# Patient Record
Sex: FEMALE | Race: WHITE | Employment: UNEMPLOYED | URBAN - METROPOLITAN AREA
[De-identification: names, ages, dates, MRNs, and addresses within clinical notes are randomized per-mention and may not be internally consistent; named-entity substitution may affect disease eponyms.]

---

## 2019-06-13 ENCOUNTER — HOSPITAL ENCOUNTER (EMERGENCY)
Age: 19
Discharge: HOME | End: 2019-06-13
Payer: COMMERCIAL

## 2019-06-13 ENCOUNTER — APPOINTMENT (OUTPATIENT)
Dept: ULTRASOUND IMAGING | Age: 19
End: 2019-06-13
Payer: COMMERCIAL

## 2019-06-13 VITALS
HEART RATE: 76 BPM | SYSTOLIC BLOOD PRESSURE: 107 MMHG | HEIGHT: 67 IN | DIASTOLIC BLOOD PRESSURE: 66 MMHG | WEIGHT: 120 LBS | RESPIRATION RATE: 17 BRPM | TEMPERATURE: 98.2 F | BODY MASS INDEX: 18.83 KG/M2 | OXYGEN SATURATION: 100 %

## 2019-06-13 DIAGNOSIS — N30.00 ACUTE CYSTITIS WITHOUT HEMATURIA: ICD-10-CM

## 2019-06-13 DIAGNOSIS — O21.0 MILD HYPEREMESIS GRAVIDARUM, ANTEPARTUM: Primary | ICD-10-CM

## 2019-06-13 DIAGNOSIS — K80.20 CALCULUS OF GALLBLADDER WITHOUT CHOLECYSTITIS WITHOUT OBSTRUCTION: ICD-10-CM

## 2019-06-13 LAB
ALBUMIN SERPL-MCNC: 4.1 G/DL (ref 3.5–4.6)
ALP BLD-CCNC: 105 U/L (ref 40–130)
ALT SERPL-CCNC: 350 U/L (ref 0–33)
ANION GAP SERPL CALCULATED.3IONS-SCNC: 17 MEQ/L (ref 9–15)
AST SERPL-CCNC: 162 U/L (ref 0–35)
BACTERIA: ABNORMAL /HPF
BASOPHILS ABSOLUTE: 0 K/UL (ref 0–0.2)
BASOPHILS RELATIVE PERCENT: 0.3 %
BILIRUB SERPL-MCNC: 1.9 MG/DL (ref 0.2–0.7)
BILIRUBIN URINE: ABNORMAL
BLOOD, URINE: NEGATIVE
BUN BLDV-MCNC: 8 MG/DL (ref 6–20)
CALCIUM SERPL-MCNC: 9.9 MG/DL (ref 8.5–9.9)
CASTS 2: ABNORMAL /LPF
CHLORIDE BLD-SCNC: 103 MEQ/L (ref 95–107)
CLARITY: ABNORMAL
CO2: 20 MEQ/L (ref 20–31)
COLOR: ABNORMAL
CREAT SERPL-MCNC: 0.45 MG/DL (ref 0.5–0.9)
EOSINOPHILS ABSOLUTE: 0 K/UL (ref 0–0.7)
EOSINOPHILS RELATIVE PERCENT: 0.2 %
EPITHELIAL CELLS, UA: ABNORMAL /HPF (ref 0–5)
GFR AFRICAN AMERICAN: >60
GFR NON-AFRICAN AMERICAN: >60
GLOBULIN: 3.4 G/DL (ref 2.3–3.5)
GLUCOSE BLD-MCNC: 81 MG/DL (ref 70–99)
GLUCOSE URINE: NEGATIVE MG/DL
HCT VFR BLD CALC: 41 % (ref 37–47)
HEMOGLOBIN: 14.6 G/DL (ref 12–16)
KETONES, URINE: >=80 MG/DL
LEUKOCYTE ESTERASE, URINE: ABNORMAL
LYMPHOCYTES ABSOLUTE: 1.2 K/UL (ref 1–4.8)
LYMPHOCYTES RELATIVE PERCENT: 10.1 %
MAGNESIUM: 2.1 MG/DL (ref 1.7–2.2)
MCH RBC QN AUTO: 31.4 PG (ref 27–31.3)
MCHC RBC AUTO-ENTMCNC: 35.6 % (ref 33–37)
MCV RBC AUTO: 88.1 FL (ref 82–100)
MONOCYTES ABSOLUTE: 0.6 K/UL (ref 0.2–0.8)
MONOCYTES RELATIVE PERCENT: 4.6 %
NEUTROPHILS ABSOLUTE: 10.2 K/UL (ref 1.4–6.5)
NEUTROPHILS RELATIVE PERCENT: 84.8 %
NITRITE, URINE: POSITIVE
PDW BLD-RTO: 12.5 % (ref 11.5–14.5)
PH UA: 5 (ref 5–9)
PLATELET # BLD: 317 K/UL (ref 130–400)
POTASSIUM SERPL-SCNC: 3.9 MEQ/L (ref 3.4–4.9)
PROTEIN UA: NEGATIVE MG/DL
RBC # BLD: 4.66 M/UL (ref 4.2–5.4)
RBC UA: ABNORMAL /HPF (ref 0–5)
SODIUM BLD-SCNC: 140 MEQ/L (ref 135–144)
SPECIFIC GRAVITY UA: 1.03 (ref 1–1.03)
TOTAL PROTEIN: 7.5 G/DL (ref 6.3–8)
URINE REFLEX TO CULTURE: YES
UROBILINOGEN, URINE: 1 E.U./DL
WBC # BLD: 12.1 K/UL (ref 4.5–11)
WBC UA: ABNORMAL /HPF (ref 0–5)

## 2019-06-13 PROCEDURE — 2580000003 HC RX 258: Performed by: PHYSICIAN ASSISTANT

## 2019-06-13 PROCEDURE — 6370000000 HC RX 637 (ALT 250 FOR IP): Performed by: PHYSICIAN ASSISTANT

## 2019-06-13 PROCEDURE — 81003 URINALYSIS AUTO W/O SCOPE: CPT

## 2019-06-13 PROCEDURE — 83735 ASSAY OF MAGNESIUM: CPT

## 2019-06-13 PROCEDURE — 87086 URINE CULTURE/COLONY COUNT: CPT

## 2019-06-13 PROCEDURE — 87186 SC STD MICRODIL/AGAR DIL: CPT

## 2019-06-13 PROCEDURE — 96376 TX/PRO/DX INJ SAME DRUG ADON: CPT

## 2019-06-13 PROCEDURE — 87077 CULTURE AEROBIC IDENTIFY: CPT

## 2019-06-13 PROCEDURE — 99284 EMERGENCY DEPT VISIT MOD MDM: CPT

## 2019-06-13 PROCEDURE — 76705 ECHO EXAM OF ABDOMEN: CPT

## 2019-06-13 PROCEDURE — 96374 THER/PROPH/DIAG INJ IV PUSH: CPT

## 2019-06-13 PROCEDURE — 36415 COLL VENOUS BLD VENIPUNCTURE: CPT

## 2019-06-13 PROCEDURE — 85025 COMPLETE CBC W/AUTO DIFF WBC: CPT

## 2019-06-13 PROCEDURE — 80053 COMPREHEN METABOLIC PANEL: CPT

## 2019-06-13 PROCEDURE — 6360000002 HC RX W HCPCS: Performed by: PHYSICIAN ASSISTANT

## 2019-06-13 RX ORDER — CEPHALEXIN 500 MG/1
500 CAPSULE ORAL ONCE
Status: COMPLETED | OUTPATIENT
Start: 2019-06-13 | End: 2019-06-13

## 2019-06-13 RX ORDER — CEPHALEXIN 500 MG/1
500 CAPSULE ORAL 3 TIMES DAILY
Qty: 21 CAPSULE | Refills: 0 | Status: SHIPPED | OUTPATIENT
Start: 2019-06-13 | End: 2019-06-20

## 2019-06-13 RX ORDER — ONDANSETRON 4 MG/1
4 TABLET, ORALLY DISINTEGRATING ORAL EVERY 8 HOURS PRN
Qty: 20 TABLET | Refills: 0 | Status: SHIPPED | OUTPATIENT
Start: 2019-06-13

## 2019-06-13 RX ORDER — ONDANSETRON 2 MG/ML
4 INJECTION INTRAMUSCULAR; INTRAVENOUS ONCE
Status: COMPLETED | OUTPATIENT
Start: 2019-06-13 | End: 2019-06-13

## 2019-06-13 RX ORDER — 0.9 % SODIUM CHLORIDE 0.9 %
2000 INTRAVENOUS SOLUTION INTRAVENOUS ONCE
Status: COMPLETED | OUTPATIENT
Start: 2019-06-13 | End: 2019-06-13

## 2019-06-13 RX ORDER — ALBUTEROL SULFATE 90 UG/1
2 AEROSOL, METERED RESPIRATORY (INHALATION) EVERY 6 HOURS PRN
COMMUNITY

## 2019-06-13 RX ADMIN — ONDANSETRON 4 MG: 2 INJECTION INTRAMUSCULAR; INTRAVENOUS at 20:08

## 2019-06-13 RX ADMIN — SODIUM CHLORIDE 2000 ML: 9 INJECTION, SOLUTION INTRAVENOUS at 17:28

## 2019-06-13 RX ADMIN — ONDANSETRON 4 MG: 2 INJECTION INTRAMUSCULAR; INTRAVENOUS at 17:33

## 2019-06-13 RX ADMIN — CEPHALEXIN 500 MG: 500 CAPSULE ORAL at 20:08

## 2019-06-13 ASSESSMENT — ENCOUNTER SYMPTOMS
ALLERGIC/IMMUNOLOGIC NEGATIVE: 1
SHORTNESS OF BREATH: 0
COLOR CHANGE: 0
ABDOMINAL PAIN: 0
EYE PAIN: 0
TROUBLE SWALLOWING: 0
APNEA: 0
NAUSEA: 1
VOMITING: 1

## 2019-06-13 ASSESSMENT — PAIN DESCRIPTION - PROGRESSION: CLINICAL_PROGRESSION: GRADUALLY WORSENING

## 2019-06-13 ASSESSMENT — PAIN DESCRIPTION - ONSET: ONSET: ON-GOING

## 2019-06-13 ASSESSMENT — PAIN SCALES - GENERAL: PAINLEVEL_OUTOF10: 4

## 2019-06-13 ASSESSMENT — PAIN DESCRIPTION - ORIENTATION: ORIENTATION: RIGHT;LEFT

## 2019-06-13 ASSESSMENT — PAIN DESCRIPTION - LOCATION: LOCATION: ABDOMEN

## 2019-06-13 ASSESSMENT — PAIN DESCRIPTION - PAIN TYPE: TYPE: ACUTE PAIN

## 2019-06-13 ASSESSMENT — PAIN DESCRIPTION - FREQUENCY: FREQUENCY: INTERMITTENT

## 2019-06-13 NOTE — ED NOTES
Pt returns from [address]. RN to bedside. Pt greeted, introductions made. Pt reports that she is feeling \"better. \" Pt revitalized and updated on plan of care; awaiting US results. Pt denies any further needs at this time. WCMAXI.      Jah Galdamez RN  06/13/19 1954

## 2019-06-13 NOTE — ED TRIAGE NOTES
Patient arrived from home via family with complaint of emesis at 13 week pregnant since yesterday. Patient A&OX3. Skin pink, warm, and dry. msp intact.

## 2019-06-13 NOTE — ED NOTES
RN assumed care of pt. Pt remains off floor in US at this time.      Ioana Romero, MAYITO  06/13/19 1932

## 2019-06-13 NOTE — ED PROVIDER NOTES
[address] [facility] ED  eMERGENCYdEPARTMENT eNCOUnter      Pt Name: Rodrick Choudhury  MRN: [id number]  [place] 2000  Date of evaluation: 6/13/2019  Provider:Gurjit Bean PA-C    CHIEF COMPLAINT       Chief Complaint   Patient presents with    Emesis During Pregnancy     13 weeks pregnant. emesis since yesterday. HISTORY OF PRESENT ILLNESS  (Location/Symptom, Timing/Onset, Context/Setting, Quality, Duration, Modifying Factors, Severity.)   Rodrick Choudhury is a 22 y.o. female who presents to the emergency department with nausea, vomiting and dehydration. Patient has 13 weeks pregnant, G1, P0 and states that she has been having issues with her nausea and vomiting this entire pregnancy. Patient is supposed to be on Zofran but she is in town visiting and forgot to bring her Zofran with her, so she states the nausea and vomiting is been severe over the past few days. Patient states that anything she eats or drinks comes back up. Patient denies any abdominal pain. Patient denies any vaginal bleeding or discharge. HPI    Nursing Notes were reviewed and I agree. REVIEW OF SYSTEMS    (2-9 systems for level 4, 10 or more for level 5)     Review of Systems   Constitutional: Positive for fatigue. Negative for diaphoresis and fever. HENT: Negative for hearing loss and trouble swallowing. Eyes: Negative for pain. Respiratory: Negative for apnea and shortness of breath. Cardiovascular: Negative for chest pain. Gastrointestinal: Positive for nausea and vomiting. Negative for abdominal pain. Endocrine: Negative. Genitourinary: Negative for hematuria. Musculoskeletal: Negative for neck pain and neck stiffness. Skin: Negative for color change. Allergic/Immunologic: Negative. Neurological: Negative for dizziness and numbness. Hematological: Negative. Psychiatric/Behavioral: Negative. All other systems reviewed and are negative.        Except as noted above the remainder of the review of systems was reviewed and negative. PAST MEDICAL HISTORY     Past Medical History:   Diagnosis Date    Asthma          SURGICAL HISTORY     History reviewed. No pertinent surgical history. CURRENT MEDICATIONS       Previous Medications    ALBUTEROL SULFATE  (90 BASE) MCG/ACT INHALER    Inhale 2 puffs into the lungs every 6 hours as needed for Wheezing       ALLERGIES     Asa [aspirin]    FAMILY HISTORY     History reviewed. No pertinent family history.        SOCIAL HISTORY       Social History     Socioeconomic History    Marital status: Single     Spouse name: None    Number of children: None    Years of education: None    Highest education level: None   Occupational History    None   Social Needs    Financial resource strain: None    Food insecurity:     Worry: None     Inability: None    Transportation needs:     Medical: None     Non-medical: None   Tobacco Use    Smoking status: Never Smoker    Smokeless tobacco: Never Used   Substance and Sexual Activity    Alcohol use: Not Currently    Drug use: Not Currently    Sexual activity: Not Currently   Lifestyle    Physical activity:     Days per week: None     Minutes per session: None    Stress: None   Relationships    Social connections:     Talks on phone: None     Gets together: None     Attends Islam service: None     Active member of club or organization: None     Attends meetings of clubs or organizations: None     Relationship status: None    Intimate partner violence:     Fear of current or ex partner: None     Emotionally abused: None     Physically abused: None     Forced sexual activity: None   Other Topics Concern    None   Social History Narrative    None       SCREENINGS           PHYSICAL EXAM    (up to 7 forlevel 4, 8 or more for level 5)     ED Triage Vitals [06/13/19 1617]   BP Temp Temp Source Heart Rate Resp SpO2 Height Weight   112/88 98.2 °F (36.8 °C) Oral 112 20 100 % 5' 7\" (1.702 m) 120 lb (54.4 kg)       Physical Exam   Constitutional: She is oriented to person, place, and time. She appears well-developed and well-nourished. No distress. HENT:   Head: Normocephalic and atraumatic. Mouth/Throat: Mucous membranes are dry. No oropharyngeal exudate. Eyes: Pupils are equal, round, and reactive to light. Conjunctivae and EOM are normal. No scleral icterus. Neck: Normal range of motion. Neck supple. No tracheal deviation present. Cardiovascular: Normal heart sounds and intact distal pulses. Tachycardia present. Pulmonary/Chest: Effort normal and breath sounds normal. No respiratory distress. Abdominal: Soft. Bowel sounds are normal. There is no tenderness. Musculoskeletal: Normal range of motion. Neurological: She is alert and oriented to person, place, and time. No cranial nerve deficit. She exhibits normal muscle tone. Skin: Skin is warm and dry. No rash noted. She is not diaphoretic. No erythema. Psychiatric: She has a normal mood and affect. Her behavior is normal. Judgment and thought content normal.   Nursing note and vitals reviewed. DIAGNOSTIC RESULTS     RADIOLOGY:   Non-plain film images such as CT, Ultrasound and MRI are read by the radiologist. Plain radiographic images are visualized and preliminarilyinterpreted by Rhonda Cortes PA-C with the below findings:      Interpretation per the Radiologist below, if available at the time of this note:    [address] [place] [address]   Final Result      Cholelithiasis. No ultrasound findings to suggest cholecystitis.           LABS:  Labs Reviewed   COMPREHENSIVE METABOLIC PANEL - Abnormal; Notable for the following components:       Result Value    Anion Gap 17 (*)     CREATININE 0.45 (*)     Total Bilirubin 1.9 (*)      (*)      (*)     All other components within normal limits   CBC WITH AUTO DIFFERENTIAL - Abnormal; Notable for the following components:    WBC 12.1 (*)     MCH 31.4 (*)     Neutrophils # 10.2 (*)     All other components within normal limits   URINE RT REFLEX TO CULTURE - Abnormal; Notable for the following components:    Color, UA DARK YELLOW (*)     Clarity, UA CLOUDY (*)     Bilirubin Urine SMALL (*)     Ketones, Urine >=80 (*)     Nitrite, Urine POSITIVE (*)     Leukocyte Esterase, Urine SMALL (*)     All other components within normal limits   MICROSCOPIC URINALYSIS - Abnormal; Notable for the following components:    Bacteria, UA MANY (*)     WBC, UA 20-50 (*)     All other components within normal limits   URINE CULTURE   MAGNESIUM   LACTIC ACID, PLASMA       All other labs were within normal range or not returnedas of this dictation. EMERGENCYDEPARTMENT COURSE and DIFFERENTIAL DIAGNOSIS/MDM:   Vitals:    Vitals:    06/13/19 1617 06/13/19 1949   BP: 112/88 107/66   Pulse: 112 76   Resp: 20 17   Temp: 98.2 °F (36.8 °C)    TempSrc: Oral    SpO2: 100% 100%   Weight: 120 lb (54.4 kg)    Height: 5' 7\" (1.702 m)        REASSESSMENT        Presents emergency department with nausea and vomiting associated with her pregnancy. Patient does have elevated LFTs and ultrasound shows cholelithiasis without cholecystitis. Patient was also found to have urinary tract infection. Patient had resolution of symptoms while in the emergency department will be treated with Zofran, Keflex and referred to PCP and OB for outpatient follow-up regarding cholelithiasis    MDM    PROCEDURES:    Procedures      FINAL IMPRESSION      1. Mild hyperemesis gravidarum, antepartum    2. Calculus of gallbladder without cholecystitis without obstruction    3.  Acute cystitis without hematuria          DISPOSITION/PLAN   DISPOSITION Decision To Discharge 06/13/2019 08:05:44 PM      PATIENT REFERRED TO:  [facility]  [address] [place], [place] [address]roxi  [phone]  Call in 2 days        DISCHARGE MEDICATIONS:  New Prescriptions    CEPHALEXIN (KEFLEX) 500 MG CAPSULE    Take 1 capsule by mouth 3 times daily for 7 days    ONDANSETRON (ZOFRAN ODT) 4 MG DISINTEGRATING TABLET    Take 1 tablet by mouth every 8 hours as needed for Nausea       (Please note that portions of this note were completed with a voice recognition program.  Efforts were made to edit the dictations but occasionally words are mis-transcribed.)    CHARMAINE Maldonado PA-C  06/13/19 2008

## 2019-06-16 LAB
ORGANISM: ABNORMAL
URINE CULTURE, ROUTINE: ABNORMAL
URINE CULTURE, ROUTINE: ABNORMAL

## 2019-11-04 ENCOUNTER — HOSPITAL ENCOUNTER (OUTPATIENT)
Age: 19
Discharge: HOME | End: 2019-11-04
Attending: OBSTETRICS & GYNECOLOGY | Admitting: OBSTETRICS & GYNECOLOGY
Payer: COMMERCIAL

## 2019-11-04 VITALS
DIASTOLIC BLOOD PRESSURE: 74 MMHG | RESPIRATION RATE: 18 BRPM | TEMPERATURE: 98.4 F | HEART RATE: 92 BPM | SYSTOLIC BLOOD PRESSURE: 117 MMHG

## 2019-11-04 LAB
AMPHETAMINE SCREEN, URINE: NORMAL
BACTERIA: NEGATIVE /HPF
BARBITURATE SCREEN URINE: NORMAL
BENZODIAZEPINE SCREEN, URINE: NORMAL
BILIRUBIN URINE: NEGATIVE
BLOOD, URINE: NEGATIVE
CANNABINOID SCREEN URINE: NORMAL
CLARITY: CLEAR
COCAINE METABOLITE SCREEN URINE: NORMAL
COLOR: ABNORMAL
EPITHELIAL CELLS, UA: ABNORMAL /HPF (ref 0–5)
GLUCOSE URINE: NEGATIVE MG/DL
HYALINE CASTS: ABNORMAL /HPF (ref 0–5)
KETONES, URINE: NEGATIVE MG/DL
LEUKOCYTE ESTERASE, URINE: ABNORMAL
Lab: NORMAL
METHADONE SCREEN, URINE: NORMAL
NITRITE, URINE: NEGATIVE
OPIATE SCREEN URINE: NORMAL
OXYCODONE URINE: NORMAL
PH UA: 5.5 (ref 5–9)
PHENCYCLIDINE SCREEN URINE: NORMAL
PROPOXYPHENE SCREEN: NORMAL
PROTEIN UA: NEGATIVE MG/DL
RBC UA: ABNORMAL /HPF (ref 0–5)
SPECIFIC GRAVITY UA: 1.01 (ref 1–1.03)
UROBILINOGEN, URINE: 1 E.U./DL
WBC UA: ABNORMAL /HPF (ref 0–5)

## 2019-11-04 PROCEDURE — 99283 EMERGENCY DEPT VISIT LOW MDM: CPT | Performed by: OBSTETRICS & GYNECOLOGY

## 2019-11-04 PROCEDURE — 99283 EMERGENCY DEPT VISIT LOW MDM: CPT

## 2019-11-04 PROCEDURE — 59025 FETAL NON-STRESS TEST: CPT | Performed by: OBSTETRICS & GYNECOLOGY

## 2019-11-04 PROCEDURE — 80307 DRUG TEST PRSMV CHEM ANLYZR: CPT

## 2019-11-04 PROCEDURE — 81001 URINALYSIS AUTO W/SCOPE: CPT

## 2019-11-04 RX ORDER — FERROUS SULFATE 325(65) MG
325 TABLET ORAL
COMMUNITY

## 2025-02-13 ENCOUNTER — HOSPITAL ENCOUNTER (EMERGENCY)
Age: 25
Discharge: HOME | End: 2025-02-13
Payer: COMMERCIAL

## 2025-02-13 VITALS
SYSTOLIC BLOOD PRESSURE: 136 MMHG | OXYGEN SATURATION: 99 % | TEMPERATURE: 99.6 F | DIASTOLIC BLOOD PRESSURE: 71 MMHG | HEART RATE: 113 BPM

## 2025-02-13 VITALS — BODY MASS INDEX: 22.3 KG/M2

## 2025-02-13 DIAGNOSIS — J06.9: Primary | ICD-10-CM

## 2025-02-13 LAB — SARS-COV-2 AG: NEGATIVE

## 2025-02-13 PROCEDURE — 87804 INFLUENZA ASSAY W/OPTIC: CPT

## 2025-02-13 PROCEDURE — 87811 SARS-COV-2 COVID19 W/OPTIC: CPT

## 2025-02-13 PROCEDURE — 99283 EMERGENCY DEPT VISIT LOW MDM: CPT

## 2025-02-13 NOTE — ED_ITS
HPI - URI/Sore Throat    
General    
Chief Complaint: Upper Respiratory Infection    
Stated Complaint: INFLUENZIA A SOB    
Time Seen by Provider: 02/13/25 11:47    
Source: patient    
Limitations: no limitations    
History of Present Illness    
HPI Narrative:     
24-year-old female presents for 2-day history of cough and congestion.  Her   
children have been diagnosed with influenza.  No vomiting or diarrhea.    
Related Data    
                                  Previous Rx's    
    
    
    
?Medication ?Instructions ?Recorded    
     
albuterol sulfate 90 mcg/actuation 2 inh inhalation Q4H PRN shortness 02/13/25    
    
aerosol inhaler of breath or wheezing #8.5 grams     
    
    
    
                                    Allergies    
    
    
    
Allergy/AdvReac Type Severity Reaction Status Date / Time    
     
aspirin Allergy Severe Anaphylaxis Verified 02/13/25 11:51    
    
    
    
    
Review of Systems    
    
    
ROS      
    
 Narrative A ten point review of systems is negative except as noted above.       
    
    
PFSH    
PFSH    
Social History    
Little interest or pleasure in doing things:  not at all     
Feeling down, depressed, or hopeless:  not at all     
    
    
    
Exam    
Narrative    
Exam Narrative:     
Nurses note and vital signs reviewed and patient is not hypoxic.    
    
General:  The patient appears well and in no apparent distress.  Patient is   
resting comfortably on cart.    
Skin:  Warm, dry, no pallor noted.  There is no rash noted.    
Head:  Normocephalic, atraumatic    
Eye: Normal conjunctiva, no drainage    
Ears, Nose, Mouth, and Throat: oral mucosa is moist. Nares patent.     
Cardiovascular:  Regular Rate and Rhythm    
Respiratory:  Patient is in no distress, no accessory muscle use, lungs are   
clear to auscultation, no wheezing, rales or rhonchi    
Back:  non-tender    
GI: Soft and nontender    
Musculoskeletal: The patient has no evidence of calf tenderness, no pitting   
edema, symmetrical pulses noted bilaterally    
Neurological: Awake and alert    
Psychiatric:  Cooperative    
Constitutional    
Vital Signs, click to edit/add:     
    
                                Last Vital Signs    
    
    
    
Temp  99.6 F   02/13/25 11:51    
     
Pulse  113 H  02/13/25 11:51    
     
Resp  18   02/13/25 11:51    
     
BP  136/71   02/13/25 11:51    
     
Pulse Ox  99   02/13/25 11:51    
     
O2 Del Method  Room Air  02/13/25 11:51    
    
    
    
    
    
Course    
Vital Signs    
Vital signs:     
    
                                   Vital Signs    
    
    
    
Temperature  99.6 F   02/13/25 11:51    
     
Pulse Rate  113 H  02/13/25 11:51    
     
Respiratory Rate  18   02/13/25 11:51    
     
Blood Pressure  136/71   02/13/25 11:51    
     
Pulse Oximetry  99   02/13/25 11:51    
     
Oxygen Delivery Method  Room Air  02/13/25 11:51    
    
    
                                            
    
    
    
Temperature  99.6 F   02/13/25 11:51    
     
Pulse Rate  113 H  02/13/25 11:51    
     
Respiratory Rate  18   02/13/25 11:51    
     
Blood Pressure  136/71   02/13/25 11:51    
     
Pulse Oximetry  99   02/13/25 11:51    
     
Oxygen Delivery Method  Room Air  02/13/25 11:51    
    
    
    
    
    
MDM - URI/Sore Throat    
MDM Narrative    
Medical decision making narrative:     
COVID and influenza swabs are negative.  My clinical impression is that she has   
a viral URI.  Treatment diagnosis and follow-up were discussed with the patient.  
 There is no indication for an antibiotic.    
Differential Diagnosis    
Differential diagnosis: Likely upper respiratory infection, viral infection,   
influenza and other (COVID)    
Lab Data    
Attestation: I reviewed the patient's lab results.    
Labs:     
    
                                   Lab Results    
    
    
    
  02/13/25 Range/Units    
    
  12:03     
     
Influenza Type A Ag  Negative      
     
Influenza Type B Ag  Negative      
     
SARS-CoV-2 Ag (CV2AG)  Negative  (NEGATIVE)      
    
    
    
    
    
Discharge Plan    
Discharge    
Chief Complaint: Upper Respiratory Infection    
    
Clinical Impression:    
 Upper respiratory infection    
    
    
Patient Disposition: Home, Self-Care    
    
Time of Disposition Decision: 13:05    
    
Condition: Good    
    
Mode of Transportation: Private Vehicle    
    
Prescriptions / Home Meds:    
New    
  albuterol sulfate 90 mcg/actuation HFA aerosol inhaler     
   2 inh inhalation Q4H PRN (Reason: shortness of breath or wheezing) Qty: 8.5   
0RF    
    
Print Language: English    
    
Instructions:  Upper Respiratory Infection (ED)    
    
Referrals:    
Physician,Non-Staff, MD [Primary Care Provider] - 1 week

## 2025-06-03 ENCOUNTER — HOSPITAL ENCOUNTER (EMERGENCY)
Age: 25
Discharge: HOME | End: 2025-06-03
Payer: COMMERCIAL

## 2025-06-03 VITALS
HEART RATE: 68 BPM | OXYGEN SATURATION: 97 % | SYSTOLIC BLOOD PRESSURE: 115 MMHG | DIASTOLIC BLOOD PRESSURE: 74 MMHG | TEMPERATURE: 98.6 F

## 2025-06-03 VITALS — BODY MASS INDEX: 22.3 KG/M2

## 2025-06-03 DIAGNOSIS — K02.9: ICD-10-CM

## 2025-06-03 DIAGNOSIS — K08.89: Primary | ICD-10-CM

## 2025-06-03 PROCEDURE — 99283 EMERGENCY DEPT VISIT LOW MDM: CPT
